# Patient Record
Sex: FEMALE | URBAN - METROPOLITAN AREA
[De-identification: names, ages, dates, MRNs, and addresses within clinical notes are randomized per-mention and may not be internally consistent; named-entity substitution may affect disease eponyms.]

---

## 2017-01-09 ENCOUNTER — OUTPATIENT (OUTPATIENT)
Dept: OUTPATIENT SERVICES | Facility: HOSPITAL | Age: 52
LOS: 1 days | End: 2017-01-09
Payer: COMMERCIAL

## 2017-01-09 ENCOUNTER — APPOINTMENT (OUTPATIENT)
Dept: NUCLEAR MEDICINE | Facility: HOSPITAL | Age: 52
End: 2017-01-09

## 2017-01-09 DIAGNOSIS — C73 MALIGNANT NEOPLASM OF THYROID GLAND: ICD-10-CM

## 2017-01-10 ENCOUNTER — APPOINTMENT (OUTPATIENT)
Dept: NUCLEAR MEDICINE | Facility: HOSPITAL | Age: 52
End: 2017-01-10

## 2017-01-11 ENCOUNTER — APPOINTMENT (OUTPATIENT)
Dept: NUCLEAR MEDICINE | Facility: HOSPITAL | Age: 52
End: 2017-01-11

## 2017-01-13 ENCOUNTER — APPOINTMENT (OUTPATIENT)
Dept: NUCLEAR MEDICINE | Facility: HOSPITAL | Age: 52
End: 2017-01-13

## 2017-01-13 PROCEDURE — 96372 THER/PROPH/DIAG INJ SC/IM: CPT

## 2017-01-13 PROCEDURE — 78018 THYROID MET IMAGING BODY: CPT

## 2017-01-13 PROCEDURE — A9528: CPT

## 2017-01-13 PROCEDURE — 78020 THYROID MET UPTAKE: CPT

## 2017-01-13 PROCEDURE — 84702 CHORIONIC GONADOTROPIN TEST: CPT

## 2017-08-17 ENCOUNTER — OTHER (OUTPATIENT)
Age: 52
End: 2017-08-17

## 2017-09-26 ENCOUNTER — OTHER (OUTPATIENT)
Age: 52
End: 2017-09-26

## 2017-09-28 ENCOUNTER — OTHER (OUTPATIENT)
Age: 52
End: 2017-09-28

## 2017-10-03 ENCOUNTER — OTHER (OUTPATIENT)
Age: 52
End: 2017-10-03

## 2017-10-06 ENCOUNTER — OTHER (OUTPATIENT)
Age: 52
End: 2017-10-06

## 2017-10-09 ENCOUNTER — OTHER (OUTPATIENT)
Age: 52
End: 2017-10-09

## 2017-10-10 ENCOUNTER — APPOINTMENT (OUTPATIENT)
Dept: ORTHOPEDIC SURGERY | Facility: CLINIC | Age: 52
End: 2017-10-10
Payer: COMMERCIAL

## 2017-10-10 VITALS
BODY MASS INDEX: 28.52 KG/M2 | SYSTOLIC BLOOD PRESSURE: 119 MMHG | DIASTOLIC BLOOD PRESSURE: 84 MMHG | WEIGHT: 165 LBS | HEART RATE: 69 BPM | HEIGHT: 63.75 IN

## 2017-10-10 DIAGNOSIS — Z80.9 FAMILY HISTORY OF MALIGNANT NEOPLASM, UNSPECIFIED: ICD-10-CM

## 2017-10-10 DIAGNOSIS — M17.11 UNILATERAL PRIMARY OSTEOARTHRITIS, RIGHT KNEE: ICD-10-CM

## 2017-10-10 DIAGNOSIS — Z82.62 FAMILY HISTORY OF OSTEOPOROSIS: ICD-10-CM

## 2017-10-10 DIAGNOSIS — Z78.9 OTHER SPECIFIED HEALTH STATUS: ICD-10-CM

## 2017-10-10 DIAGNOSIS — Z82.61 FAMILY HISTORY OF ARTHRITIS: ICD-10-CM

## 2017-10-10 DIAGNOSIS — Z85.850 PERSONAL HISTORY OF MALIGNANT NEOPLASM OF THYROID: ICD-10-CM

## 2017-10-10 PROCEDURE — 99204 OFFICE O/P NEW MOD 45 MIN: CPT

## 2017-10-10 PROCEDURE — 73564 X-RAY EXAM KNEE 4 OR MORE: CPT | Mod: RT

## 2017-10-10 RX ORDER — LEVOTHYROXINE SODIUM 0.17 MG/1
TABLET ORAL
Refills: 0 | Status: ACTIVE | COMMUNITY

## 2017-11-06 ENCOUNTER — RX RENEWAL (OUTPATIENT)
Age: 52
End: 2017-11-06

## 2017-12-11 ENCOUNTER — APPOINTMENT (OUTPATIENT)
Dept: ORTHOPEDIC SURGERY | Facility: CLINIC | Age: 52
End: 2017-12-11

## 2017-12-11 VITALS
DIASTOLIC BLOOD PRESSURE: 99 MMHG | HEIGHT: 63.75 IN | WEIGHT: 165 LBS | BODY MASS INDEX: 28.52 KG/M2 | HEART RATE: 75 BPM | SYSTOLIC BLOOD PRESSURE: 152 MMHG

## 2017-12-28 ENCOUNTER — OTHER (OUTPATIENT)
Age: 52
End: 2017-12-28

## 2017-12-29 ENCOUNTER — APPOINTMENT (OUTPATIENT)
Dept: ORTHOPEDIC SURGERY | Facility: CLINIC | Age: 52
End: 2017-12-29
Payer: COMMERCIAL

## 2017-12-29 VITALS
SYSTOLIC BLOOD PRESSURE: 152 MMHG | WEIGHT: 170 LBS | DIASTOLIC BLOOD PRESSURE: 99 MMHG | HEIGHT: 63 IN | BODY MASS INDEX: 30.12 KG/M2 | HEART RATE: 90 BPM | TEMPERATURE: 98.3 F

## 2017-12-29 DIAGNOSIS — M79.642 PAIN IN LEFT HAND: ICD-10-CM

## 2017-12-29 PROCEDURE — 99215 OFFICE O/P EST HI 40 MIN: CPT

## 2017-12-29 PROCEDURE — 73130 X-RAY EXAM OF HAND: CPT | Mod: LT

## 2018-05-25 ENCOUNTER — APPOINTMENT (OUTPATIENT)
Dept: ORTHOPEDIC SURGERY | Facility: CLINIC | Age: 53
End: 2018-05-25
Payer: COMMERCIAL

## 2018-05-25 VITALS
DIASTOLIC BLOOD PRESSURE: 85 MMHG | SYSTOLIC BLOOD PRESSURE: 123 MMHG | WEIGHT: 170 LBS | BODY MASS INDEX: 30.12 KG/M2 | HEART RATE: 70 BPM | HEIGHT: 63 IN

## 2018-05-25 DIAGNOSIS — M18.0 BILATERAL PRIMARY OSTEOARTHRITIS OF FIRST CARPOMETACARPAL JOINTS: ICD-10-CM

## 2018-05-25 PROCEDURE — 20600 DRAIN/INJ JOINT/BURSA W/O US: CPT | Mod: FA

## 2018-05-25 PROCEDURE — 99214 OFFICE O/P EST MOD 30 MIN: CPT | Mod: 25

## 2018-05-27 ENCOUNTER — TRANSCRIPTION ENCOUNTER (OUTPATIENT)
Age: 53
End: 2018-05-27

## 2018-06-07 ENCOUNTER — APPOINTMENT (OUTPATIENT)
Dept: ORTHOPEDIC SURGERY | Facility: CLINIC | Age: 53
End: 2018-06-07

## 2019-03-29 ENCOUNTER — TRANSCRIPTION ENCOUNTER (OUTPATIENT)
Age: 54
End: 2019-03-29

## 2021-05-17 PROBLEM — D22.9 MULTIPLE BENIGN NEVI: Status: ACTIVE | Noted: 2021-05-17

## 2021-05-17 PROBLEM — L81.4 LENTIGINES: Status: ACTIVE | Noted: 2021-05-17

## 2021-05-18 ENCOUNTER — APPOINTMENT (OUTPATIENT)
Dept: DERMATOLOGY | Facility: CLINIC | Age: 56
End: 2021-05-18

## 2021-05-18 DIAGNOSIS — D22.9 MELANOCYTIC NEVI, UNSPECIFIED: ICD-10-CM

## 2021-05-18 DIAGNOSIS — L81.4 OTHER MELANIN HYPERPIGMENTATION: ICD-10-CM

## 2021-05-19 NOTE — HISTORY OF PRESENT ILLNESS
[FreeTextEntry1] : spot on abdomen and hand [de-identified] : 55 year old female here with spot on abdomen and hand.

## 2022-09-29 ENCOUNTER — NON-APPOINTMENT (OUTPATIENT)
Age: 57
End: 2022-09-29

## 2022-10-25 ENCOUNTER — APPOINTMENT (OUTPATIENT)
Dept: RHEUMATOLOGY | Facility: CLINIC | Age: 57
End: 2022-10-25
Payer: COMMERCIAL

## 2022-10-25 ENCOUNTER — NON-APPOINTMENT (OUTPATIENT)
Age: 57
End: 2022-10-25

## 2022-10-25 VITALS
DIASTOLIC BLOOD PRESSURE: 107 MMHG | WEIGHT: 170 LBS | HEIGHT: 64 IN | BODY MASS INDEX: 29.02 KG/M2 | SYSTOLIC BLOOD PRESSURE: 160 MMHG | OXYGEN SATURATION: 95 % | TEMPERATURE: 98.8 F | HEART RATE: 88 BPM

## 2022-10-25 DIAGNOSIS — M25.569 PAIN IN UNSPECIFIED KNEE: ICD-10-CM

## 2022-10-25 PROCEDURE — 99204 OFFICE O/P NEW MOD 45 MIN: CPT | Mod: 25

## 2022-10-25 PROCEDURE — 99214 OFFICE O/P EST MOD 30 MIN: CPT | Mod: 25

## 2022-10-25 PROCEDURE — 36415 COLL VENOUS BLD VENIPUNCTURE: CPT

## 2022-10-25 RX ORDER — MELOXICAM 7.5 MG/1
7.5 TABLET ORAL TWICE DAILY
Qty: 60 | Refills: 0 | Status: COMPLETED | COMMUNITY
Start: 2017-10-10 | End: 2022-10-25

## 2022-10-25 NOTE — HISTORY OF PRESENT ILLNESS
[FreeTextEntry1] : 58 y/o female w/ Hx of thyroid CA s/p thyroidectomy and iodine referred to rheumatology for joint pains. \par Pt reports polyarthralgia including R>L knees (>1 year), b/l 1st CMCs (triggered by specific diet) (2-3 years), b/l shoulders, b/l ankles (stiffness when going down the stairs) (sporadic for 3 years). Reports swelling of ankles. Pt reports b/l knees looks bigger. Pt has not had any imaging done. Pt takes Advil PRN for the pains.\par Reports chronic rash of abdomen x 1.5 years since COVID infection, temporarily treated with dermatology creams.\par Reports SOB. 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 Pt was offered L 1st CMC surgery but she declined.\par \par Denies joint swelling, joint erythema/warmth.\par Denies fatigue, fever, nasopharyngeal ulcers, chest pain, abdominal pain, cough, nausea, vomiting, alopecia, dry eyes, dry mouth, miscarriages, Hx of DVT/PEs.\par ROS negative unless otherwise noted above.\par \par No family Hx of autoimmune disease.\par \par

## 2022-10-25 NOTE — ASSESSMENT
[FreeTextEntry1] : 56 y/o female w/ Hx of thyroid CA s/p thyroidectomy and iodine referred to rheumatology for joint pains. \par Pt reports polyarthralgia including R>L knees (>1 year), b/l 1st CMCs (triggered by specific diet) (2-3 years), b/l shoulders, b/l ankles (stiffness when going down the stairs) (sporadic for 3 years). Reports swelling of ankles. Pt reports b/l knees looks bigger. Pt has not had any imaging done. Pt takes Advil PRN for the pains.\par Reports chronic rash of abdomen x 1.5 years since COVID infection, temporarily treated with dermatology creams.\par Reports SOB. 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 Pt was offered L 1st CMC surgery but she declined.\par No family Hx of autoimmune disease.\par \par Pt with non-inflammatory polyarthralgia which has not been worked up yet. Low suspicion for autoimmune rheum diseases, I suspect R shoulder RCT, b/l knee OA or soft tissue injury with associated joint effusions.\par \par - Obtain labwork to evaluate polyarthralgia, medication safety\par - Obtain XR b/l hands, wrists, R shoulder, b/l knees, b/l ankles\par - Rx meloxicam 15mg daily PRN. Advised to try standing for 7-10 days, then PRN\par - I advised that the NSAID should be taken with food.  In addition while taking the prescribed NSAID, no over the counter or other NSAIDs should be used, such as ibuprofen (Motrin or Advil) or naproxen (Aleve) as this can cause stomach upset or other side effects.  If needed for fever or breakthrough pain Tylenol can be used.\par - Discussed with patient at length about treatment of OA and soft tissue injuries including oral/topical analgesics, pain therapies (yoga, dwayne chi, acupuncture, chiropractor, massage therapy, wax therapy, etc.), PT/OT, steroid injections, surgeries. Advised on healthy diet, exercise, smoking avoidance, weight loss, stress management, sleep hygiene, control of comorbidities to help with management of pain and improve daily function.\par - Pt doing pilates regularly which helps with her pains - encouraged to continue. Advised to let me know if interested in PT\par - RTC 6 weeks for follow up. Will consider other NSAIDs or analgesics, advanced imaging, arthrocentesis of baker's cysts, steroid injections, ortho referral\par \par

## 2022-10-26 LAB
ALBUMIN SERPL ELPH-MCNC: 4.4 G/DL
ALP BLD-CCNC: 79 U/L
ALT SERPL-CCNC: 27 U/L
ANION GAP SERPL CALC-SCNC: 13 MMOL/L
AST SERPL-CCNC: 23 U/L
BASOPHILS # BLD AUTO: 0.03 K/UL
BASOPHILS NFR BLD AUTO: 0.5 %
BILIRUB SERPL-MCNC: 0.4 MG/DL
BUN SERPL-MCNC: 14 MG/DL
CALCIUM SERPL-MCNC: 9.4 MG/DL
CCP AB SER IA-ACNC: <8 UNITS
CHLORIDE SERPL-SCNC: 103 MMOL/L
CO2 SERPL-SCNC: 23 MMOL/L
CREAT SERPL-MCNC: 0.85 MG/DL
CRP SERPL-MCNC: <3 MG/L
EGFR: 80 ML/MIN/1.73M2
EOSINOPHIL # BLD AUTO: 0.16 K/UL
EOSINOPHIL NFR BLD AUTO: 2.6 %
ERYTHROCYTE [SEDIMENTATION RATE] IN BLOOD BY WESTERGREN METHOD: 15 MM/HR
GLUCOSE SERPL-MCNC: 107 MG/DL
HCT VFR BLD CALC: 41.3 %
HGB BLD-MCNC: 13.8 G/DL
IMM GRANULOCYTES NFR BLD AUTO: 0.2 %
LYMPHOCYTES # BLD AUTO: 1.65 K/UL
LYMPHOCYTES NFR BLD AUTO: 26.9 %
MAN DIFF?: NORMAL
MCHC RBC-ENTMCNC: 28.4 PG
MCHC RBC-ENTMCNC: 33.4 GM/DL
MCV RBC AUTO: 85 FL
MONOCYTES # BLD AUTO: 0.38 K/UL
MONOCYTES NFR BLD AUTO: 6.2 %
NEUTROPHILS # BLD AUTO: 3.91 K/UL
NEUTROPHILS NFR BLD AUTO: 63.6 %
PLATELET # BLD AUTO: 327 K/UL
POTASSIUM SERPL-SCNC: 3.7 MMOL/L
PROT SERPL-MCNC: 6.9 G/DL
RBC # BLD: 4.86 M/UL
RBC # FLD: 13.8 %
RF+CCP IGG SER-IMP: NEGATIVE
RHEUMATOID FACT SER QL: <10 IU/ML
SODIUM SERPL-SCNC: 139 MMOL/L
WBC # FLD AUTO: 6.14 K/UL

## 2022-12-06 ENCOUNTER — APPOINTMENT (OUTPATIENT)
Dept: RHEUMATOLOGY | Facility: CLINIC | Age: 57
End: 2022-12-06

## 2022-12-06 VITALS
OXYGEN SATURATION: 97 % | HEIGHT: 64 IN | WEIGHT: 174 LBS | HEART RATE: 75 BPM | BODY MASS INDEX: 29.71 KG/M2 | SYSTOLIC BLOOD PRESSURE: 143 MMHG | DIASTOLIC BLOOD PRESSURE: 90 MMHG

## 2022-12-06 DIAGNOSIS — Z79.1 LONG TERM (CURRENT) USE OF NON-STEROIDAL ANTI-INFLAMMATORIES (NSAID): ICD-10-CM

## 2022-12-06 PROCEDURE — 99214 OFFICE O/P EST MOD 30 MIN: CPT

## 2022-12-06 PROCEDURE — 99072 ADDL SUPL MATRL&STAF TM PHE: CPT

## 2022-12-06 NOTE — HISTORY OF PRESENT ILLNESS
[FreeTextEntry1] : HISTORY: \par 56 y/o female w/ Hx of thyroid CA s/p thyroidectomy and iodine referred to rheumatology for joint pains.  \par Pt reports polyarthralgia including R>L knees (>1 year), b/l 1st CMCs (triggered by specific diet) (2-3 years), b/l shoulders, b/l ankles (stiffness when going down the stairs) (sporadic for 3 years). Reports swelling of ankles. Pt reports b/l knees looks bigger. Pt has not had any imaging done. Pt takes Advil PRN for the pains. \par Reports chronic rash of abdomen x 1.5 years since COVID infection, temporarily treated with dermatology creams. \par Reports SOB. Occasional recurrent diarrhea \par Reports Raynaud's some fingers turn white with pain in cold temperature. 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 Pt was offered L 1st CMC surgery but she declined. \par No family Hx of autoimmune disease. \par \par Pt with non-inflammatory polyarthralgia which has not been worked up yet. Low suspicion for autoimmune rheum diseases, I suspect R shoulder RCT, b/l knee OA or soft tissue injury with associated joint effusions. \par \par INTERVAL HISTORY: \par Pt states that meloxicam daily significantly improves her symptoms but when she stops it, the pains return in 2-3 days. 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

## 2023-02-17 ENCOUNTER — NON-APPOINTMENT (OUTPATIENT)
Age: 58
End: 2023-02-17

## 2023-05-16 ENCOUNTER — APPOINTMENT (OUTPATIENT)
Dept: RHEUMATOLOGY | Facility: CLINIC | Age: 58
End: 2023-05-16
Payer: COMMERCIAL

## 2023-05-16 DIAGNOSIS — M13.0 POLYARTHRITIS, UNSPECIFIED: ICD-10-CM

## 2023-05-16 DIAGNOSIS — M79.641 PAIN IN RIGHT HAND: ICD-10-CM

## 2023-05-16 DIAGNOSIS — M79.642 PAIN IN RIGHT HAND: ICD-10-CM

## 2023-05-16 DIAGNOSIS — M25.511 PAIN IN RIGHT SHOULDER: ICD-10-CM

## 2023-05-16 PROCEDURE — 99214 OFFICE O/P EST MOD 30 MIN: CPT

## 2023-05-16 NOTE — ASSESSMENT
[FreeTextEntry1] : 56 y/o female w/ Hx of thyroid CA s/p thyroidectomy and iodine presents as follow up for joint pains.  \par Pt reports polyarthralgia including R>L knees (>1 year), b/l 1st CMCs (triggered by specific diet) (2-3 years), b/l shoulders, b/l ankles (stiffness when going down the stairs) (sporadic for 3 years). Reports swelling of ankles. Pt reports b/l knees looks bigger. Pt has not had any imaging done. Pt takes Advil PRN for the pains.  \par Reports chronic rash of abdomen x 1.5 years since COVID infection, temporarily treated with dermatology creams.  \par Reports SOB. Occasional recurrent diarrhea  \par Reports Raynaud's some fingers turn white with pain in cold temperature.  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 Pt was offered L 1st CMC surgery but she declined.  \par No family Hx of autoimmune disease.  \par \par Pt with non-inflammatory polyarthralgia. Low suspicion for autoimmune rheum diseases, I suspect R shoulder RCT, b/l knee OA or soft tissue injury with associated joint effusions. Labwork by me negative for signs of autoimmune inflammatory arthritis. XRs with few areas of IP OA, but otherwise normal. No concerns for underlying autoimmune rheumatologic diseases. \par \par Pt was tried on meloxicam daily PRN with good relief of polyarthralgia but right shoulder pain is refractory.\par MRI R shoulder was ordered, initially denied but approved upon appeal.\par \par - Obtain MR R shoulder - pain in R shoulder significant and refractory to meloxicam prescribed by me for >6 weeks (Approved until 8/2023)\par - c/w meloxicam 15mg daily PRN. Advised to see if taking every other day keeps the pain at bay. Given pt is taking high dose meloxicam regularly, pt should get her labwork checked regularly. \par - I advised that the NSAID should be taken with food. In addition while taking the prescribed NSAID, no over the counter or other NSAIDs should be used, such as ibuprofen (Motrin or Advil) or naproxen (Aleve) as this can cause stomach upset or other side effects. If needed for fever or breakthrough pain Tylenol can be used.  \par - Discussed with patient at length about treatment of OA and soft tissue injuries including oral/topical analgesics, pain therapies (yoga, dwayne chi, acupuncture, chiropractor, massage therapy, wax therapy, etc.), PT/OT, steroid injections, surgeries. Advised on healthy diet, exercise, smoking avoidance, weight loss, stress management, sleep hygiene, control of comorbidities to help with management of pain and improve daily function.  \par - Pt doing pilates regularly which helps with her pains - encouraged to continue. Advised to let me know if interested in PT  \par - Will call pt with MR results. Pt to return if pt needs any joint injections (R shoulder, b/l 1st CMCs, b/l knees)\par

## 2023-05-16 NOTE — HISTORY OF PRESENT ILLNESS
[FreeTextEntry1] : HISTORY: \par 58 y/o female w/ Hx of thyroid CA s/p thyroidectomy and iodine presents as follow up for joint pains.  \par Pt reports polyarthralgia including R>L knees (>1 year), b/l 1st CMCs (triggered by specific diet) (2-3 years), b/l shoulders, b/l ankles (stiffness when going down the stairs) (sporadic for 3 years). Reports swelling of ankles. Pt reports b/l knees looks bigger. Pt has not had any imaging done. Pt takes Advil PRN for the pains.  \par Reports chronic rash of abdomen x 1.5 years since COVID infection, temporarily treated with dermatology creams.  \par Reports SOB. Occasional recurrent diarrhea  \par Reports Raynaud's some fingers turn white with pain in cold temperature.  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 Pt was offered L 1st CMC surgery but she declined.  \par No family Hx of autoimmune disease.  \par \par Pt with non-inflammatory polyarthralgia. Low suspicion for autoimmune rheum diseases, I suspect R shoulder RCT, b/l knee OA or soft tissue injury with associated joint effusions. Labwork by me negative for signs of autoimmune inflammatory arthritis. XRs with few areas of IP OA, but otherwise normal. No concerns for underlying autoimmune rheumatologic diseases. \par \par Pt was tried on meloxicam daily PRN with good relief of polyarthralgia but right shoulder pain is refractory. \par \par INTERVAL HISTORY: \par Pt continues to have R shoulder pain worse with overuse. Pt also continues to have b/l 1st CMC and b/l knee pains that are recurrent. Pt takes meloxicam PRN with some relief. Pt at this time would like to defer R shoulder or other joint injections but will let me know if she feels they are needed.\par Pt tried to get the R shoulder MRI but was told it was denied. 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

## 2023-05-24 ENCOUNTER — NON-APPOINTMENT (OUTPATIENT)
Age: 58
End: 2023-05-24

## 2023-09-04 ENCOUNTER — RX RENEWAL (OUTPATIENT)
Age: 58
End: 2023-09-04

## 2023-09-04 RX ORDER — MELOXICAM 15 MG/1
15 TABLET ORAL
Qty: 90 | Refills: 1 | Status: ACTIVE | COMMUNITY
Start: 2022-10-25 | End: 1900-01-01

## 2024-08-25 ENCOUNTER — NON-APPOINTMENT (OUTPATIENT)
Age: 59
End: 2024-08-25

## 2025-03-12 ENCOUNTER — APPOINTMENT (OUTPATIENT)
Dept: GASTROENTEROLOGY | Facility: CLINIC | Age: 60
End: 2025-03-12

## 2025-04-08 ENCOUNTER — NON-APPOINTMENT (OUTPATIENT)
Age: 60
End: 2025-04-08

## 2025-05-07 ENCOUNTER — NON-APPOINTMENT (OUTPATIENT)
Age: 60
End: 2025-05-07

## 2025-06-12 ENCOUNTER — APPOINTMENT (OUTPATIENT)
Dept: GASTROENTEROLOGY | Facility: CLINIC | Age: 60
End: 2025-06-12
Payer: COMMERCIAL

## 2025-06-12 VITALS
HEART RATE: 75 BPM | HEIGHT: 64 IN | WEIGHT: 171 LBS | SYSTOLIC BLOOD PRESSURE: 106 MMHG | RESPIRATION RATE: 14 BRPM | BODY MASS INDEX: 29.19 KG/M2 | OXYGEN SATURATION: 98 % | DIASTOLIC BLOOD PRESSURE: 79 MMHG | TEMPERATURE: 97.4 F

## 2025-06-12 PROBLEM — Z12.11 SCREENING FOR COLON CANCER: Status: ACTIVE | Noted: 2025-06-12

## 2025-06-12 PROBLEM — K21.9 CHRONIC GERD: Status: ACTIVE | Noted: 2025-06-12

## 2025-06-12 PROBLEM — Z71.9 ENCOUNTER FOR CONSULTATION: Status: ACTIVE | Noted: 2025-06-12

## 2025-06-12 PROCEDURE — 99204 OFFICE O/P NEW MOD 45 MIN: CPT

## 2025-06-12 RX ORDER — PROGESTERONE 100 MG/1
100 CAPSULE ORAL
Refills: 0 | Status: ACTIVE | COMMUNITY

## 2025-06-12 RX ORDER — UBIDECARENONE/VIT E ACET 100MG-5
CAPSULE ORAL
Refills: 0 | Status: ACTIVE | COMMUNITY

## 2025-06-12 RX ORDER — CANDESARTAN CILEXETIL 4 MG/1
4 TABLET ORAL
Refills: 0 | Status: ACTIVE | COMMUNITY

## 2025-06-12 RX ORDER — ROSUVASTATIN CALCIUM 10 MG/1
10 TABLET, FILM COATED ORAL
Refills: 0 | Status: ACTIVE | COMMUNITY

## 2025-06-12 RX ORDER — OMEPRAZOLE 20 MG/1
20 CAPSULE, DELAYED RELEASE ORAL DAILY
Qty: 90 | Refills: 1 | Status: ACTIVE | COMMUNITY
Start: 2025-06-12 | End: 1900-01-01

## 2025-06-12 RX ORDER — BACILLUS COAGULANS/INULIN 1B-250 MG
CAPSULE ORAL
Refills: 0 | Status: ACTIVE | COMMUNITY

## 2025-09-04 ENCOUNTER — NON-APPOINTMENT (OUTPATIENT)
Age: 60
End: 2025-09-04

## 2025-09-04 RX ORDER — SODIUM SULFATE, POTASSIUM SULFATE AND MAGNESIUM SULFATE 1.6; 3.13; 17.5 G/177ML; G/177ML; G/177ML
17.5-3.13-1.6 SOLUTION ORAL
Qty: 1 | Refills: 0 | Status: ACTIVE | COMMUNITY
Start: 2025-09-04 | End: 1900-01-01

## 2025-09-04 RX ORDER — SODIUM SULFATE, MAGNESIUM SULFATE, AND POTASSIUM CHLORIDE 17.75; 2.7; 2.25 G/1; G/1; G/1
1479-225-188 TABLET ORAL
Qty: 24 | Refills: 0 | Status: ACTIVE | COMMUNITY
Start: 2025-09-04 | End: 1900-01-01

## 2025-09-10 ENCOUNTER — APPOINTMENT (OUTPATIENT)
Dept: GASTROENTEROLOGY | Facility: CLINIC | Age: 60
End: 2025-09-10
Payer: COMMERCIAL

## 2025-09-10 DIAGNOSIS — K76.9 LIVER DISEASE, UNSPECIFIED: ICD-10-CM

## 2025-09-10 DIAGNOSIS — K80.10 CALCULUS OF GALLBLADDER WITH CHRONIC CHOLECYSTITIS W/OUT OBSTRUCTION: ICD-10-CM

## 2025-09-10 DIAGNOSIS — R79.89 OTHER SPECIFIED ABNORMAL FINDINGS OF BLOOD CHEMISTRY: ICD-10-CM

## 2025-09-10 PROCEDURE — 99213 OFFICE O/P EST LOW 20 MIN: CPT | Mod: 93

## 2025-09-17 ENCOUNTER — NON-APPOINTMENT (OUTPATIENT)
Age: 60
End: 2025-09-17

## 2025-09-18 ENCOUNTER — APPOINTMENT (OUTPATIENT)
Dept: SURGERY | Facility: CLINIC | Age: 60
End: 2025-09-18
Payer: COMMERCIAL

## 2025-09-18 VITALS
RESPIRATION RATE: 14 BRPM | DIASTOLIC BLOOD PRESSURE: 76 MMHG | TEMPERATURE: 97.9 F | WEIGHT: 163 LBS | OXYGEN SATURATION: 99 % | BODY MASS INDEX: 30 KG/M2 | SYSTOLIC BLOOD PRESSURE: 115 MMHG | HEART RATE: 77 BPM | HEIGHT: 62 IN

## 2025-09-18 DIAGNOSIS — K80.20 CALCULUS OF GALLBLADDER W/OUT CHOLECYSTITIS W/OUT OBSTRUCTION: ICD-10-CM

## 2025-09-18 PROCEDURE — 99203 OFFICE O/P NEW LOW 30 MIN: CPT

## 2025-09-24 PROBLEM — K21.9 ACID REFLUX: Status: ACTIVE | Noted: 2025-09-24

## 2025-09-24 PROBLEM — R14.2 BELCHING: Status: ACTIVE | Noted: 2025-09-24

## 2025-09-24 PROBLEM — R11.0 NAUSEA: Status: ACTIVE | Noted: 2025-09-24

## 2025-09-24 PROBLEM — R09.A2 GLOBUS SENSATION: Status: ACTIVE | Noted: 2025-09-24

## 2025-09-25 PROBLEM — K80.20 CALCULUS OF GALLBLADDER WITHOUT CHOLECYSTITIS WITHOUT OBSTRUCTION: Status: ACTIVE | Noted: 2025-09-25
